# Patient Record
Sex: FEMALE | Race: ASIAN | Employment: STUDENT | URBAN - METROPOLITAN AREA
[De-identification: names, ages, dates, MRNs, and addresses within clinical notes are randomized per-mention and may not be internally consistent; named-entity substitution may affect disease eponyms.]

---

## 2022-09-22 ENCOUNTER — SEXUAL HEALTH (OUTPATIENT)
Dept: SURGERY | Facility: CLINIC | Age: 22
End: 2022-09-22

## 2022-09-22 DIAGNOSIS — Z11.3 SCREENING FOR STD (SEXUALLY TRANSMITTED DISEASE): Primary | ICD-10-CM

## 2022-09-22 NOTE — PROGRESS NOTES
Assessment/Plan:  Cervical swab collected for GC/CT testing  Wet mount done  Blood collected for HIV/syphilis testing  Recommend safer sex practices including 100% condom use  Recommend regular STD testing  Follow up 1 week for results  Practicing safe sex using a condom for each sexually encounter  Condoms offer the best protection against STD's by acting as a physical barrier to prevent the exchange of semen, vaginal fluids, and blood between partners  Condoms are not a 100% effective in protection  Reducing the number of sexual partners can also help to reduce the risk of the transmission of STD's along with regular STD screening  Patient verbalized understanding of all that was discusse today and agrees with plan  She was able to ask all of her questions and comprehensive answers were provided  No problem-specific Assessment & Plan notes found for this encounter  Diagnoses and all orders for this visit:    Screening for STD (sexually transmitted disease)          Subjective:      Patient ID: Alisha Crockett is a 25 y o  female  Patient presents to STD clinic for STD screening  Has one male partner and reports inconsistent condom use  Has been having white vaginal discharge for a few days  Has started using OTC Monistat 2 days ago  Denies burning, pain, itching, foul smell, fever, chills, rashes , sores  The following portions of the patient's history were reviewed and updated as appropriate: allergies, current medications, past family history, past medical history, past social history, past surgical history and problem list     Review of Systems   Constitutional: Negative for chills, diaphoresis, fatigue and fever  Genitourinary: Positive for vaginal discharge (white discharge)  Negative for decreased urine volume, difficulty urinating, dyspareunia, dysuria, frequency, genital sores, hematuria, pelvic pain, urgency, vaginal bleeding and vaginal pain     Skin: Negative for rash and wound    Hematological: Negative for adenopathy  Objective: There were no vitals taken for this visit  Physical Exam  Nursing note reviewed  Exam conducted with a chaperone present  Constitutional:       General: She is not in acute distress  Appearance: Normal appearance  She is well-developed  She is not ill-appearing, toxic-appearing or diaphoretic  Genitourinary:     Exam position: Supine  Labia:         Right: No rash, tenderness, lesion or injury  Left: No rash, tenderness, lesion or injury  Vagina: Normal  No signs of injury and foreign body  No vaginal discharge, erythema, tenderness or bleeding  Cervix: Friability and erythema present  No discharge, lesion, cervical bleeding or eversion  Uterus: Not enlarged and not tender  Neurological:      Mental Status: She is alert  Psychiatric:         Mood and Affect: Mood normal          Behavior: Behavior normal          Thought Content: Thought content normal          Judgment: Judgment normal                 CHIEF CONCERN(S)    LMP: 08/26/2022    LPS: patient is unsure    Birth Control Method: none  Condom Used: Yes    Sexual Preference :  Male    Date of Last Sexual Exposure: 09/16/2022    # of Partners: Last 30 days 1, Last 80 days 1 and Last Year 3    Sexual Practices: Oral and Vaginal      Previously Sexually Transmitted Diseases  Type Date Source of Care Treatment Comment   None                         Test Date Results   RPR never    HIV never    GC never    CT never          1  CURRENT RISK BEHAVIOR(S)    Unprotected sex with multiple/anonymous partners     PREVIOUS SUCCESSES    Used condoms when having sex and Discussed condom use prior to having sex with partner(s)        3  SAFER GOAL BEHAVIOR(S)    Always use condoms to have sex, Practice monogamy and Get tested if condom breaks/ leaks          4   PERSON ACTION PLAN:    > BARRIERS:    Get involved in a monogamist relationship    > BENEFITS:    Provides a healthier sexual relationship and can reduce STD's        > ACTION STEPS:      Use condoms at least 50% of the time and steadily increase over time until condom use is 100% of the time, Discuss condom use prior to having sex with partner(s) and Get tested is an exposure occurred such as a condom breaks/ leaked          4   REFERRALS:    HIV consent given

## 2022-09-29 ENCOUNTER — SEXUAL HEALTH (OUTPATIENT)
Dept: SURGERY | Facility: CLINIC | Age: 22
End: 2022-09-29

## 2022-09-29 DIAGNOSIS — Z71.2 ENCOUNTER TO DISCUSS TEST RESULTS: Primary | ICD-10-CM
